# Patient Record
Sex: MALE | Race: WHITE | URBAN - METROPOLITAN AREA
[De-identification: names, ages, dates, MRNs, and addresses within clinical notes are randomized per-mention and may not be internally consistent; named-entity substitution may affect disease eponyms.]

---

## 2018-11-20 ENCOUNTER — HOSPITAL ENCOUNTER (EMERGENCY)
Age: 33
Discharge: HOME | End: 2018-11-20
Payer: COMMERCIAL

## 2018-11-20 VITALS
RESPIRATION RATE: 16 BRPM | OXYGEN SATURATION: 99 % | SYSTOLIC BLOOD PRESSURE: 145 MMHG | TEMPERATURE: 97.7 F | HEIGHT: 67 IN | HEART RATE: 90 BPM | WEIGHT: 200 LBS | BODY MASS INDEX: 31.39 KG/M2 | DIASTOLIC BLOOD PRESSURE: 99 MMHG

## 2018-11-20 DIAGNOSIS — K08.89 DENTALGIA: Primary | ICD-10-CM

## 2018-11-20 DIAGNOSIS — K05.10 GINGIVITIS: ICD-10-CM

## 2018-11-20 DIAGNOSIS — F17.210 CIGARETTE SMOKER: ICD-10-CM

## 2018-11-20 PROCEDURE — 99282 EMERGENCY DEPT VISIT SF MDM: CPT

## 2018-11-20 RX ORDER — ACETAMINOPHEN AND CODEINE PHOSPHATE 300; 30 MG/1; MG/1
1 TABLET ORAL EVERY 6 HOURS PRN
Qty: 10 TABLET | Refills: 0 | Status: SHIPPED | OUTPATIENT
Start: 2018-11-20 | End: 2018-11-25

## 2018-11-20 RX ORDER — CLINDAMYCIN HYDROCHLORIDE 300 MG/1
300 CAPSULE ORAL 4 TIMES DAILY
Qty: 28 CAPSULE | Refills: 0 | Status: SHIPPED | OUTPATIENT
Start: 2018-11-20 | End: 2018-11-27

## 2018-11-20 ASSESSMENT — ENCOUNTER SYMPTOMS
FACIAL SWELLING: 0
SORE THROAT: 0
WHEEZING: 0
SHORTNESS OF BREATH: 0
DIARRHEA: 0
APNEA: 0
SINUS PAIN: 0
SINUS PRESSURE: 0
RHINORRHEA: 0
ABDOMINAL DISTENTION: 0
TROUBLE SWALLOWING: 0
CHEST TIGHTNESS: 0
NAUSEA: 0
VOICE CHANGE: 0
STRIDOR: 0
CONSTIPATION: 0
COUGH: 0
BACK PAIN: 0
COLOR CHANGE: 0
CHOKING: 0
VOMITING: 0
ABDOMINAL PAIN: 0

## 2018-11-20 ASSESSMENT — PAIN SCALES - GENERAL: PAINLEVEL_OUTOF10: 9

## 2018-11-21 NOTE — ED PROVIDER NOTES
**EVALUATED BY ADVANCED PRACTICE PROVIDER**        [address] [facility]  eMERGENCY dEPARTMENT eNCOUnter      Pt Name: Hesham Palacios  POF:[id number]  [place] 1985  Date of evaluation: 11/20/2018  Provider: Thomas Ramirez PA-C      Chief Complaint:    Chief Complaint   Patient presents with    Dental Pain     pt reports having a tooth broken \" a while ago\" pt states he is having significant pain from the tooth and can not see dentist until December. Nursing Notes, Past Medical Hx, Past Surgical Hx, Social Hx, Allergies, and Family Hx were all reviewed and agreed with or any disagreements were addressed in the HPI.    HPI:  (Location, Duration, Timing, Severity,Quality, Assoc Sx, Context, Modifying factors)  This is a  29 y.o. male with history of dental caries and poor dentition who presents to the emergency department with right lower dental pain for 2 weeks. Denies any new injuries. He has had severe decay and tooth appears slightly chipped for several years. Denies any new blunt trauma to this area. There is been no spontaneous drainage or bleeding. Denies numbness, tingling or weakness. No facial swelling with this. No documented fever or chills. He rates his pain to be a 9 out of 10 on pain scale. He continues to smoke cigarettes. PastMedical/Surgical History:      Diagnosis Date    ADHD (attention deficit hyperactivity disorder)     Anxiety     Back pain     Hyperlipidemia     Pleurisy     PTSD (post-traumatic stress disorder)     Tailbone injury          Procedure Laterality Date    DENTAL SURGERY      RECTAL SURGERY      prolapsed rectum       Medications:  Discharge Medication List as of 11/20/2018  8:19 PM      CONTINUE these medications which have NOT CHANGED    Details   gabapentin (NEURONTIN) 300 MG capsule Take 300 mg by mouth as needed. diazepam (VALIUM) 10 MG tablet Take 10 mg by mouth 2 times daily. Radha Sieve Historical Med

## 2018-12-17 ENCOUNTER — HOSPITAL ENCOUNTER (EMERGENCY)
Age: 33
Discharge: HOME | End: 2018-12-17
Payer: COMMERCIAL

## 2018-12-17 ENCOUNTER — APPOINTMENT (OUTPATIENT)
Dept: GENERAL RADIOLOGY | Age: 33
End: 2018-12-17
Payer: COMMERCIAL

## 2018-12-17 VITALS
HEART RATE: 100 BPM | WEIGHT: 200 LBS | OXYGEN SATURATION: 98 % | BODY MASS INDEX: 31.39 KG/M2 | DIASTOLIC BLOOD PRESSURE: 85 MMHG | TEMPERATURE: 98 F | SYSTOLIC BLOOD PRESSURE: 139 MMHG | HEIGHT: 67 IN | RESPIRATION RATE: 16 BRPM

## 2018-12-17 DIAGNOSIS — S93.402A SPRAIN OF LEFT ANKLE, UNSPECIFIED LIGAMENT, INITIAL ENCOUNTER: Primary | ICD-10-CM

## 2018-12-17 DIAGNOSIS — S99.912A INJURY OF LEFT ANKLE, INITIAL ENCOUNTER: ICD-10-CM

## 2018-12-17 PROCEDURE — 6370000000 HC RX 637 (ALT 250 FOR IP): Performed by: PHYSICIAN ASSISTANT

## 2018-12-17 PROCEDURE — 99283 EMERGENCY DEPT VISIT LOW MDM: CPT

## 2018-12-17 PROCEDURE — 73610 X-RAY EXAM OF ANKLE: CPT

## 2018-12-17 RX ORDER — HYDROCODONE BITARTRATE AND ACETAMINOPHEN 5; 325 MG/1; MG/1
2 TABLET ORAL ONCE
Status: COMPLETED | OUTPATIENT
Start: 2018-12-17 | End: 2018-12-17

## 2018-12-17 RX ORDER — TRAMADOL HYDROCHLORIDE 50 MG/1
50 TABLET ORAL EVERY 6 HOURS PRN
Qty: 20 TABLET | Refills: 0 | Status: SHIPPED | OUTPATIENT
Start: 2018-12-17 | End: 2018-12-20

## 2018-12-17 RX ADMIN — HYDROCODONE BITARTRATE AND ACETAMINOPHEN 2 TABLET: 5; 325 TABLET ORAL at 09:50

## 2018-12-17 ASSESSMENT — ENCOUNTER SYMPTOMS
ABDOMINAL PAIN: 0
RESPIRATORY NEGATIVE: 1
COUGH: 0
DIARRHEA: 0
SHORTNESS OF BREATH: 0
VOMITING: 0
CONSTIPATION: 0
COLOR CHANGE: 0
NAUSEA: 0
BACK PAIN: 0

## 2018-12-17 ASSESSMENT — PAIN DESCRIPTION - ORIENTATION: ORIENTATION: LEFT

## 2018-12-17 ASSESSMENT — PAIN SCALES - GENERAL
PAINLEVEL_OUTOF10: 10
PAINLEVEL_OUTOF10: 10

## 2018-12-17 ASSESSMENT — PAIN DESCRIPTION - LOCATION: LOCATION: ANKLE

## 2018-12-17 NOTE — ED PROVIDER NOTES
[address] [facility]  eMERGENCY dEPARTMENT eNCOUnter        Pt Name: Robinson Almaraz  MRN: [id number]  [place] 1985  Date of evaluation: 12/17/2018  Provider: RAY White  PCP: Dariela You DO    This patient was not seen and evaluated by the attending physician but available for consultation as needed. CHIEF COMPLAINT       Chief Complaint   Patient presents with    Ankle Pain     Pt twisted his left ankle on the steps SUnday evening. NO lights and he stepped into a hole       HISTORY OF PRESENT ILLNESS   (Location/Symptom, Timing/Onset, Context/Setting, Quality, Duration, Modifying Factors, Severity)  Note limiting factors. Robinson Almaraz is a 29 y.o. male with past medical history ADHD, anxiety, back pain, hyperlipidemia, PTSD and pleurisy who presents ED with complaint of ankle pain. Patient states he twisted his left ankle last night. Patient states he has steps outside but states there is no outside light. Patient states he denies given his landlord for light but he has not had it done. Patient states he stepped in a hole and rolled his left ankle. Patient states unable to ambulate due to swelling and pain. States aching pain rated 10/10 to the lateral aspect of the left ankle. Denies ecchymoses, erythema or warmth. Denies fever or chills. Denies numbness or tingling. Denies abrasion or laceration. States decreased range of motion and strength. Denies foot pain or knee pain. Became concerned and came to ED for further evaluation and treatment. Denies any other injury or trauma throughout. Nursing Notes were all reviewed and agreed with or any disagreements were addressed  in the HPI. REVIEW OF SYSTEMS    (2-9 systems for level 4, 10 or more for level 5)     Review of Systems   Constitutional: Negative for chills and fever. Respiratory: Negative. Negative for cough and shortness of breath.     Cardiovascular: aspects of distal toes. Compartments soft. Decreased range of motion and strength throughout due to pain. Gait deferred. Neurological: He is alert and oriented to person, place, and time. Skin: Skin is warm and dry. He is not diaphoretic. No erythema. No pallor. Psychiatric: He has a normal mood and affect. His behavior is normal.       DIAGNOSTIC RESULTS   LABS:    Labs Reviewed - No data to display    All other labs were within normal range or not returned as of this dictation. EKG: All EKG's are interpreted by the Emergency Department Physician who either signs orCo-signs this chart in the absence of a cardiologist.  Please see their note for interpretation of EKG. RADIOLOGY:   Non-plain film images such as CT, Ultrasound and MRI are read by the radiologist. Plain radiographic images are visualized andpreliminarily interpreted by the  ED Provider with the below findings:        Interpretation perthe Radiologist below, if available at the time of this note:    XR ANKLE LEFT (MIN 3 VIEWS)   Final Result   No evidence of fracture           Xr Ankle Left (min 3 Views)    Result Date: 12/17/2018  EXAMINATION: 3 XRAY VIEWS OF THE LEFT ANKLE 12/17/2018 9:48 am COMPARISON: None. HISTORY: ORDERING SYSTEM PROVIDED HISTORY: injury TECHNOLOGIST PROVIDED HISTORY: Reason for exam:->injury Ordering Physician Provided Reason for Exam: Ankle Pain (Pt twisted his left ankle on the steps SUnday evening. NO lights and he stepped into a hole) Acuity: Unknown Type of Exam: Unknown FINDINGS: Lateral soft tissue swelling.   No evidence of fracture or dislocation     No evidence of fracture         PROCEDURES   Unless otherwise noted below, none     Procedures    CRITICAL CARE TIME   N/A    CONSULTS:  None      EMERGENCY DEPARTMENT COURSE and DIFFERENTIALDIAGNOSIS/MDM:   Vitals:    Vitals:    12/17/18 0933   BP: 139/85   Pulse: 100   Resp: 16   Temp: 98 °F (36.7 °C)   TempSrc: Infrared   SpO2: 98%   Weight: 200 lb (90.7 kg)   Height: 5' 7\" (1.702 m)       Patient was given thefollowing medications:  Medications   HYDROcodone-acetaminophen (NORCO) 5-325 MG per tablet 2 tablet (2 tablets Oral Given 12/17/18 0901)       Patient is a 28-year-old male who presents ED implant of the left ankle injury/sprain. Upon examination patient afebrile with stable vital signs. Nontoxic appearance. Decreased range of motion and strength due to pain. Distal neurovascular intact. X-ray obtained and showed no evidence of fracture or dislocation. Soft tissue swelling noted. Given history and physical examination likely suffering from a left ankle injury which I believe most likely due to sprain/strain. Given crutches and Aircast.  Patient instructed to ice, elevate and rest the area as much as possible. Apparently allergic to NSAIDs and given Ultram for pain for home. Follow with PCP and orthopedic surgery. Return ED for any worsening symptoms. Discharged home in stable condition. Low suspicion for acute fracture, dislocation, Achilles tendon rupture, proximal fibular fracture, Lisfranc injury, Charcot foot, nerve involvement, vascular compromise, septic arthritis, gout, cellulitis, abscess, DVT, arterial occlusion, compartment syndrome or other emergent etiology at this time. FINAL IMPRESSION      1. Sprain of left ankle, unspecified ligament, initial encounter    2. Injury of left ankle, initial encounter          DISPOSITION/PLAN   DISPOSITION Decision To Discharge 12/17/2018 10:06:20 AM      PATIENT REFERREDTO:  Chin Mccarty DO  [address] [place] [address]  [phone]    Go to   As needed, If symptoms worsen    Charles Lane MD  [address] [address]  [phone]    Schedule an appointment as soon as possible for a visit   For a Re-check in   5-7   days.       DISCHARGE MEDICATIONS:  New Prescriptions    TRAMADOL (ULTRAM) 50 MG TABLET    Take 1 tablet by mouth every 6 hours as

## 2018-12-17 NOTE — LETTER
[facility] Emergency Department  [address] [address]  Phone: [phone]               December 17, 2018    Patient: Damion Stringer   YOB: 1985   Date of Visit: 12/17/2018       To Whom It May Concern:    Aye Palomo was seen and treated in our emergency department on 12/17/2018. He may return to work on 12/19/2018.       Sincerely,       Ayleen Blue RN         Signature:__________________________________